# Patient Record
Sex: MALE | Race: WHITE
[De-identification: names, ages, dates, MRNs, and addresses within clinical notes are randomized per-mention and may not be internally consistent; named-entity substitution may affect disease eponyms.]

---

## 2019-07-27 ENCOUNTER — HOSPITAL ENCOUNTER (EMERGENCY)
Dept: HOSPITAL 74 - FER | Age: 44
Discharge: HOME | End: 2019-07-27
Payer: COMMERCIAL

## 2020-01-02 ENCOUNTER — HOSPITAL ENCOUNTER (EMERGENCY)
Dept: HOSPITAL 74 - FER | Age: 45
LOS: 1 days | Discharge: HOME | End: 2020-01-03
Payer: COMMERCIAL

## 2020-01-02 VITALS — DIASTOLIC BLOOD PRESSURE: 81 MMHG | TEMPERATURE: 98.3 F | SYSTOLIC BLOOD PRESSURE: 121 MMHG | HEART RATE: 84 BPM

## 2020-01-02 VITALS — BODY MASS INDEX: 38.2 KG/M2

## 2020-01-02 DIAGNOSIS — I10: ICD-10-CM

## 2020-01-02 DIAGNOSIS — F17.210: ICD-10-CM

## 2020-01-02 DIAGNOSIS — E78.5: ICD-10-CM

## 2020-01-02 DIAGNOSIS — R55: Primary | ICD-10-CM

## 2020-01-02 LAB
ALBUMIN SERPL-MCNC: 3.7 G/DL (ref 3.4–5)
ALP SERPL-CCNC: 57 U/L (ref 45–117)
ALT SERPL-CCNC: 27 U/L (ref 13–61)
ANION GAP SERPL CALC-SCNC: 7 MMOL/L (ref 8–16)
AST SERPL-CCNC: 20 U/L (ref 15–37)
BASOPHILS # BLD: 0.4 % (ref 0–2)
BILIRUB SERPL-MCNC: 0.7 MG/DL (ref 0.2–1)
BUN SERPL-MCNC: 17 MG/DL (ref 7–18)
CALCIUM SERPL-MCNC: 8.4 MG/DL (ref 8.5–10)
CHLORIDE SERPL-SCNC: 104 MMOL/L (ref 98–107)
CO2 SERPL-SCNC: 24 MMOL/L (ref 21–32)
CREAT SERPL-MCNC: 1 MG/DL (ref 0.55–1.3)
DEPRECATED RDW RBC AUTO: 11.9 % (ref 11.9–15.9)
EOSINOPHIL # BLD: 1.2 % (ref 0–4.5)
GLUCOSE SERPL-MCNC: 148 MG/DL (ref 74–106)
HCT VFR BLD CALC: 44.3 % (ref 35.4–49)
HGB BLD-MCNC: 15 GM/DL (ref 11.7–16.9)
LYMPHOCYTES # BLD: 24.9 % (ref 8–40)
MCH RBC QN AUTO: 29.3 PG (ref 25.7–33.7)
MCHC RBC AUTO-ENTMCNC: 33.8 G/DL (ref 32–35.9)
MCV RBC: 86.7 FL (ref 80–96)
MONOCYTES # BLD AUTO: 4.3 % (ref 3.8–10.2)
NEUTROPHILS # BLD: 69.2 % (ref 42.8–82.8)
PLATELET # BLD AUTO: 277 K/MM3 (ref 134–434)
PMV BLD: 8.1 FL (ref 7.5–11.1)
POTASSIUM SERPLBLD-SCNC: 3.5 MMOL/L (ref 3.5–5.1)
PROT SERPL-MCNC: 6.5 G/DL (ref 6.4–8.2)
RBC # BLD AUTO: 5.11 M/MM3 (ref 4–5.6)
SODIUM SERPL-SCNC: 135 MMOL/L (ref 136–145)
WBC # BLD AUTO: 11.4 K/MM3 (ref 4–10.8)

## 2020-01-02 PROCEDURE — 3E0337Z INTRODUCTION OF ELECTROLYTIC AND WATER BALANCE SUBSTANCE INTO PERIPHERAL VEIN, PERCUTANEOUS APPROACH: ICD-10-PCS

## 2020-01-02 NOTE — PDOC
History of Present Illness





- General


Chief Complaint: Syncope/Near Syncope


Stated Complaint: NEAR SYNCOPE


Time Seen by Provider: 01/02/20 23:13


History Source: Patient


Exam Limitations: No Limitations





- History of Present Illness


Initial Comments: 





01/02/20 23:15





Mr. Ryan is a 45 yo m who presents to the ER via EMS s/p syncopal episode


The patient was in his usual state of health


This afternoon had a deep tissue massage


Pt happened to be seated on a chair, quickly stood up


In the presence of the rest of the team at the [facility], he began to tilt over


He was caught by his fellow team members and was lowered to the ground


They witnessed his eyes roll to the back of his head


He has a loss of consciousness for seconds





No palpitations prior to syncopal event


This episode was not exertional


No family history of sudden death or syncope


No recent h/o bleeding or petechiae.


No chest pain


No rhythmic shaking or post ictal period noted


Pt returned to his baseline immediately





Currently, pt states he feels tired


Of note:


Pt has had a gastroenteritis for the past few days


Has been trying to eat but immediately after eating, he has been having diarrhea


No abdominal pain


No fevers











PMH: HTN, HLD


PSH: denies


Meds: please see MAR


ALL: NKDA


Social: Tobacco use, ETOH socially











01/02/20 23:25


ROS:


GENERAL/CONSTITUTIONAL: No: fever, chills, weakness, loss of appetite.


HEAD, EYES, EARS, NOSE AND THROAT: No: change in vision 


CARDIOVASCULAR: Yes: syncope No: chest pain, lightheadedness, palpitations 


RESPIRATORY: No: cough, shortness of breath, wheezing 


GASTROINTESTINAL: No: nausea, vomiting, abdominal pain


GENITOURINARY: No: dysuria, hematuria, frequency, urgency, flank pain.


MUSCULOSKELETAL: No: back pain, neck pain, joint pain, muscle swelling or pain


SKIN: No: lesions, pallor, rash or easy bruising.


NEUROLOGIC: Yes: syncope No: headache, vertigo, paresthesias, weakness 


ENDOCRINE: No: unexplained weight gain or loss


HEMATOLOGIC/LYMPHATIC: No: anemia, easy bleeding, swelling nodes


 





PE:


GENERAL: The patient is in no acute distress.


HEAD: Normal 


EYES: PERRLA, EOMI, sclera anicteric, conjunctiva clear.


ENT: Ears normal, nares patent, oropharynx clear without exudates.  Moist 

mucous membranes.


NECK: Normal range of motion, supple 


LUNGS: Breath sounds equal, clear to auscultation bilaterally.  No wheezes, and 

no crackles.


HEART:Regular rate and rhythm, normal S1 and S2 without murmur, rub or gallop.


ABDOMEN: Soft, nontender, normoactive bowel sounds.  No guarding, no rebound.


EXTREMITIES: Normal range of motion, no edema.   


NEUROLOGICAL: Cranial nerves II through XII grossly intact.  Normal speech.  No 

focal neurological deficits. 


MUSCULOSKELETAL:  Back non-tender to palpation, no CVA tenderness


SKIN: Warm, Dry, normal turgor, no rashes or lesions noted.





01/03/20 02:39








Past History





- Past Medical History


Allergies/Adverse Reactions: 


 Allergies











Allergy/AdvReac Type Severity Reaction Status Date / Time


 


No Known Allergies Allergy   Verified 07/27/19 10:58











Home Medications: 


Ambulatory Orders





Bupropion HCl [Bupropion Xl] 300 mg PO DAILY 07/27/19 


Fenofibrate,Micronized [Fenofibrate] 134 mg PO DAILY 07/27/19 


Quinapril HCl 10 mg PO DAILY 07/27/19 


Venlafaxine HCl ER [Effexor Xr -] 37.5 mg PO DAILY 07/27/19 








COPD: No


HTN: Yes


Hypercholesterolemia: Yes





- Psycho Social/Smoking Cessation Hx


Smoking History: Current every day smoker


Have you smoked in the past 12 months: Yes


Number of Cigarettes Smoked Daily: 15


'Breaking Loose' booklet given: 07/27/19


Drug/Substance Use Hx: No





ED Treatment Course





- LABORATORY


CBC & Chemistry Diagram: 


 01/02/20 23:20





 01/02/20 23:20





Medical Decision Making





- Medical Decision Making





01/02/20 23:15





This is a 45 yo M who presents to the ER s/p syncopal episode witnessed by his 

 team


No high risk features - older age, absence of prodrome, palpitations prior to 

syncope, exertional syncope or chest pain


No family history of sudden death, no evidence of bleeding on examination, 

normal vital signs on arrival and since arrival to the ER


no heart murmur on examination 








01/02/20 23:31


Will do:


Labs


EKG


CT head


IVF


Anticipate D/c


01/02/20 23:47


 Laboratory Tests











  01/02/20 01/02/20





  23:20 23:20


 


WBC   11.4 H


 


Hgb   15.0


 


Hct   44.3


 


Plt Count   277


 


BUN  17.0 


 


Creatinine  1.0 











 Laboratory Tests











  01/02/20





  23:20


 


Troponin I  < 0.03











01/03/20 00:40


EKG: 


NSR rate of 81 bpm, axis nml, intervals nml, no st elevation or depression, RSR'





CT head: 


EXAM: HEAD CT WITHOUT CONTRAST


HISTORY: Slurred speech


COMPARISON: None.


FINDINGS:


No evidence of hemorrhage, acute territorial infarction, mass effect, midline 

shift, hydrocephalus,


or extra-axial collections.


No hyperdense arterial or venous sign


Clear paranasal sinuses, mastoid air cells, and middle ear cavities


The calvarium is intact


Small soft tissue nodule within the right parietal convexity scalp


IMPRESSION:


1. No acute intracranial pathology





Will d/c home





Clinical impression: Vasovagal episode





Discharge





- Discharge Information


Problems reviewed: Yes


Clinical Impression/Diagnosis: 


 Syncope and collapse





Condition: Stable


Disposition: HOME





- Admission


No





- Follow up/Referral





- Patient Discharge Instructions


Patient Printed Discharge Instructions:  DI for Syncope in Adults (Fainting)


Additional Instructions: 


Mr Goodrich,





Thank you for coming in to the ER today


Please review all your results


Please be sure to follow up with your primary care physician within 1 week for 

re assessment


Return to the ER for any other concerns or complaints


Stay as hydrated as possible








- Post Discharge Activity

## 2020-01-03 NOTE — EKG
Test Reason : 

Blood Pressure : ***/*** mmHG

Vent. Rate : 081 BPM     Atrial Rate : 081 BPM

   P-R Int : 172 ms          QRS Dur : 106 ms

    QT Int : 378 ms       P-R-T Axes : 055 -03 029 degrees

   QTc Int : 439 ms

 

NORMAL SINUS RHYTHM

INCOMPLETE RIGHT BUNDLE BRANCH BLOCK

BORDERLINE ECG

NO PREVIOUS ECGS AVAILABLE

Confirmed by EDIN SALAMANCA, HI ([id number]) on 1/3/2020 1:25:48 PM

 

Referred By: ANGELY RUIZ           Confirmed By:HI JESUS MD